# Patient Record
Sex: MALE | URBAN - METROPOLITAN AREA
[De-identification: names, ages, dates, MRNs, and addresses within clinical notes are randomized per-mention and may not be internally consistent; named-entity substitution may affect disease eponyms.]

---

## 2021-11-17 ENCOUNTER — HOSPITAL ENCOUNTER (EMERGENCY)
Facility: CLINIC | Age: 86
Discharge: LEFT BEFORE DISCHARGE | End: 2021-11-17

## 2021-11-18 NOTE — ED TRIAGE NOTES
Patient stated to registration he was feeling better after using inhaler 4x.   Would not wait to see triage nurse.